# Patient Record
Sex: MALE | Race: WHITE | Employment: UNEMPLOYED | URBAN - METROPOLITAN AREA
[De-identification: names, ages, dates, MRNs, and addresses within clinical notes are randomized per-mention and may not be internally consistent; named-entity substitution may affect disease eponyms.]

---

## 2023-09-07 ENCOUNTER — HOSPITAL ENCOUNTER (EMERGENCY)
Age: 33
Discharge: HOME | End: 2023-09-07
Attending: EMERGENCY MEDICINE

## 2023-09-07 VITALS
BODY MASS INDEX: 20.46 KG/M2 | TEMPERATURE: 98.1 F | WEIGHT: 135 LBS | HEIGHT: 68 IN | SYSTOLIC BLOOD PRESSURE: 144 MMHG | HEART RATE: 77 BPM | RESPIRATION RATE: 15 BRPM | OXYGEN SATURATION: 99 % | DIASTOLIC BLOOD PRESSURE: 105 MMHG

## 2023-09-07 DIAGNOSIS — F19.10 SUBSTANCE ABUSE (HCC): Primary | ICD-10-CM

## 2023-09-07 PROCEDURE — 99284 EMERGENCY DEPT VISIT MOD MDM: CPT

## 2023-09-07 ASSESSMENT — PAIN - FUNCTIONAL ASSESSMENT: PAIN_FUNCTIONAL_ASSESSMENT: 0-10

## 2023-09-07 ASSESSMENT — PAIN SCALES - GENERAL: PAINLEVEL_OUTOF10: 0

## 2023-09-07 NOTE — CARE COORDINATION
Patient presents to the ER from [address] [place] [address]. States he was admitted to the program yesterday and was told that if he did not go to work today he would be discharged from the program. 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 SW met with the patient, provided a directory of inpatient detox programs along with residential recovery options. Confirmed with [facility] that they do detox for fentanyl. Patient advised to call today to complete an interview and then again first thing in the morning as beds are first come first serve. Patient also instructed to call [facility] for addictions recovery support and resource brokering. Patient given a bus pass.      Jaja De Oliveira LMSW    [facility]

## 2023-09-07 NOTE — ED TRIAGE NOTES
Pt arrives via EMS from . Pt sate he was attempting to go to rehab but was not accepted. Pt c/o chills, tremors and generalized weakness. EMS states pt states he has hx pf drug use and wants help.